# Patient Record
Sex: FEMALE | Race: BLACK OR AFRICAN AMERICAN
[De-identification: names, ages, dates, MRNs, and addresses within clinical notes are randomized per-mention and may not be internally consistent; named-entity substitution may affect disease eponyms.]

---

## 2018-04-17 ENCOUNTER — HOSPITAL ENCOUNTER (OUTPATIENT)
Dept: HOSPITAL 58 - RAD | Age: 53
Discharge: HOME | End: 2018-04-17
Attending: INTERNAL MEDICINE

## 2018-04-17 VITALS — BODY MASS INDEX: 36.8 KG/M2

## 2018-04-17 DIAGNOSIS — R27.0: ICD-10-CM

## 2018-04-17 DIAGNOSIS — R42: Primary | ICD-10-CM

## 2018-04-17 DIAGNOSIS — M54.2: ICD-10-CM

## 2018-04-17 NOTE — DI
Exam:  Five x-rays of the cervical spine. 

  

Comparison:  CT performed 08/28/2013. 

  

Reason for exam:  Neck pain. 

  

FINDINGS:  No acute fracture or listhesis.  The vertebral body and intervertebral body disc space hei
ghts are well maintained.  The prevertebral soft tissues are within normal limits. There is a normal 
appearing cervical lordotic curve.  The dens appears intact on the open-mouth odontoid view. 

  

Impression: 

  

No acute fracture or listhesis in the cervical spine.

## 2018-04-18 NOTE — MRI
EXAM:  Brain MRI with and without contrast. 

  

HISTORY:  Dizziness. 

  

COMPARISON:  Head CT 08/28/2013. 

  

TECHNIQUE:  Multiplanar, multisequence MR images were acquired of the brain before and after administ
ration of intravenous contrast. 

  

FINDINGS:  The midline structures are central and the craniocervical junction is unremarkable.  The v
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 The 
sulci are prominent in both parietal lobes and there is mild widening of the fissures and the superio
r cerebellum and superior vermis.  There are no abnormal extra-axial fluid collections. 

  

The brain parenchyma has no restricted diffusion to suggest acute hypoperfusion or infarction.  There
 is a thin rim of periventricular T2 hyperintensity and there are several ovoid T2 hyperintensities o
riented tangential to the lateral ventricle.  Some of these extend into the corpus callosum and are m
ore well defined on the T1W sequence compatible with black holes.  More patchy T2 hyperintensities ar
e present in the bilateral parieto-occipital periventricular white matter.  There are greater than ni
ne T2 hyperintensities in the supratentorial white matter.  There is a faint probable T2 hyperintensi
ty in the anterior right temporal lobe and in the medial right temporal lobe.  These findings are com
patible with mild leukomalacia. There is no abnormal dark gradient echo signal to indicate hemosideri
n staining.  After administration of gadolinium, none of these white matter lesions demonstrate enhan
cement and there are are no enhancing parenchymal masses.  The corpus callosum is normal in size and 
configuration.  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  The pituitary gl
and is normal in size and has homogeneous contrast enhancement.  The infundibulum is near midline. 

  

No intraorbital masses are present and there is no abnormal contrast enhancement in the optic nerves 
or chiasm.  Paranasal sinuses, middle ears mastoids are clear.  There is a small left nasopharyngeal 
submucosal cyst.  There is no abnormal contrast enhancement in the internal auditory canals or labyri
nthine structures. 

  

Flow voids are present in the major intracranial arteries and dural venous sinuses. 

  

IMPRESSION: 

1.  No intracranial mass, hemorrhage or acute cerebral infarct. 

2.  Age related involutional changes involving both parietal lobes and mild upper cerebellar and supe
rior vermian volume loss which is nonspecific. 

3.  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
entricle and involving the corpus callosum.  None enhance.  Clinical considerations include demyelina
ting disorders such as 

multiple sclerosis, infectious and noninfectious inflammatory disease such as Lyme disease, vasculiti
s and collagen vascular disease.  Further evaluation is advised and neurology consultation may be hel
pful.